# Patient Record
Sex: MALE | Race: WHITE
[De-identification: names, ages, dates, MRNs, and addresses within clinical notes are randomized per-mention and may not be internally consistent; named-entity substitution may affect disease eponyms.]

---

## 2020-01-31 ENCOUNTER — HOSPITAL ENCOUNTER (EMERGENCY)
Dept: HOSPITAL 74 - FER | Age: 40
Discharge: HOME | End: 2020-01-31
Payer: COMMERCIAL

## 2020-01-31 VITALS — HEART RATE: 65 BPM | SYSTOLIC BLOOD PRESSURE: 116 MMHG | TEMPERATURE: 98.4 F | DIASTOLIC BLOOD PRESSURE: 71 MMHG

## 2020-01-31 VITALS — BODY MASS INDEX: 31.7 KG/M2

## 2020-01-31 DIAGNOSIS — R09.81: Primary | ICD-10-CM

## 2020-01-31 NOTE — PDOC
Attending Attestation





- Resident


Resident Name: KalieCariCandace





- ED Attending Attestation


I have performed the following: I have examined & evaluated the patient, The 

case was reviewed & discussed with the resident, I agree w/resident's findings 

& plan, Exceptions are as noted





- HPI


HPI: 





01/31/20 18:39


39-year-old male no past medical history here today complaining of 2 weeks of 

nasal congestion and sore throat and now productive cough.  Patient states that 

he initially thought it was sinusitis as he has had allergic sinusitis in the 

past.  Started taking Claritin without much relief.  Over the last few days has 

developed now a sore throat and green phlegm with worsening sinus congestion.  

Denies any fevers or chills no nausea no vomiting tolerating p.o. denies any 

recent travel does have a sick contact of his wife who is also being seen here 

today for sore throat and nasal congestion.  Has been taking DayQuil with 

minimal relief no other current complaints





- Physicial Exam


PE: 





01/31/20 18:40


Awake alert no acute distress bilateral nasal turbinate enlargement clear 

rhinorrhea throat with posterior pharynx cobblestoning.  No tonsillar exudates.

  Lungs are clear bilaterally heart is regular 30 murmurs rubs or gallops 

abdomen soft nontender skin is warm and dry patient is awake alert and oriented 

x3





- Medical Decision Making





01/31/20 18:40


39-year-old male here with sinusitis-like symptoms and now postnasal drip with 

cough possibly viral versus allergic in origin.  Likely a viral viral 

component.  Plan Sudafed recommended in addition to Flonase lots hydration and 

rest.  Prescription to pharmacy sent for Sudafed and Flonase to be used as 

directed.

## 2020-01-31 NOTE — PDOC
History of Present Illness





- General


Chief Complaint: Cold Symptoms


Stated Complaint: FLU SYMPTOMS


Time Seen by Provider: 01/31/20 17:35





- History of Present Illness


Initial Comments: 


HPI: 


HPI: 


38yo M with no reported PMH complaining of cough, congestion, chills for the 

past two weeks. Patient's sick contacts include his wife who has similar 

symptoms, but they started after his. No fevers. Cough was productive of clear 

sputum that has since turned yellow. Reports chills. Has taken dayquil and 

claritin for his symptoms with some relief. No nausea or vomiting. Tolerating po

, but has a lessened appetite. Denies recent travel. No chest pain or shortness 

of breath. 





ROS:


Constitutional: no fever, +chills


HEENT: no throat pain, no dysphagia


Cardiovascular: no chest pain, no palpitations


Respiratory: +cough, no shortness of breath


Gastrointestinal: no abdominal pain, no nausea


Genitourinary: no dysuria, no hematuria


Musculoskeletal: no myalgia, no arthralgia


Skin: no rash, no itching


Neurologic: no headache, no weakness


Psych: no agitation, no confusion





PE: 


General: Awake, alert, and fully oriented, in no acute distress


Head: No signs of trauma


Eyes: EOMI, sclera anicteric


ENT: Moist mucus membranes


Neck: Normal ROM, supple


Lungs: Lungs clear, Normal breath sounds


Cardio: Regular rhythm, S1 and S2 present


Abdomen: Soft, nontender


Extremities: Normal range of motion


SKIN: Warm, Dry, normal turgor


Neurologic: Cranial nerves II through XII grossly intact. Normal speech





ED Course/MDM: 


DDX including but not limited to viral illness, bronchitis, anemia, metabolic 

derangement


Presentation consistent with viral syndrome


VS WNL


Counseled supportive care


Good handwashing


Sudafed, flonase, motrin sent to pharmacy


Return precautions


Stable for discharge








Past History





- Past Medical History


Allergies/Adverse Reactions: 


 Allergies











Allergy/AdvReac Type Severity Reaction Status Date / Time


 


No Known Allergies Allergy   Verified 01/31/20 17:39











Home Medications: 


Ambulatory Orders





No Home Medications 0 dose .ROUTE UTDICT 02/13/13 


Fluticasone Prop 0.05% Nasal [Flonase -] 1 - 2 spray NS DAILY #1 spray.pump 01/ 31/20 


Ibuprofen 400 mg PO Q6H PRN #30 tablet 01/31/20 


Pseudoephedrine HCl [Sudafed] 60 mg PO Q6H #20 tablet 01/31/20 








Asthma: Yes


COPD: No





- Psycho Social/Smoking Cessation Hx


Smoking Status: No


Smoking History: Never smoked


Number of Cigarettes Smoked Daily: 0


Hx Alcohol Use: No


Drug/Substance Use Hx: No





*Physical Exam





- Vital Signs


 Last Vital Signs











Temp Pulse Resp BP Pulse Ox


 


 98.4 F   65   19   116/71   97 


 


 01/31/20 17:32  01/31/20 17:32  01/31/20 17:32  01/31/20 17:32  01/31/20 17:32














Discharge





- Discharge Information


Problems reviewed: Yes


Clinical Impression/Diagnosis: 


 Congestion of nasal sinus





Condition: Stable


Disposition: HOME





- Additional Discharge Information


Prescriptions: 


Fluticasone Prop 0.05% Nasal [Flonase -] 1 - 2 spray NS DAILY #1 spray.pump


Ibuprofen 400 mg PO Q6H PRN #30 tablet


 PRN Reason: Pain


Pseudoephedrine HCl [Sudafed] 60 mg PO Q6H #20 tablet





- Follow up/Referral





- Patient Discharge Instructions


Patient Printed Discharge Instructions:  DI for Nasal Congestion


Additional Instructions: 


You came into the emergency department. Your exam did not indicate acute 

pathology. 





Eat and hydrate throughout the day to prevent dehydration and low blood sugar 

levels. 





Prescription for ibuprofen (also available over the counter), flonase, and 

sudafed sent to your pharmacy. Take as instructed. 





Follow up with your primary care physician within 72 hours. Call and make an 

appointment to further evaluate your symptoms.  Your workup is not complete 

until you do so. 





Immediate medical attention is required if you have: any chest pain, 

palpitations, shortness of breath, severe headaches, changes in vision, 

episodes of fainting, focal numbness or weakness, any severe abdominal pain, 

any black tarry stool, or any new or concerning symptoms.  If you think you are 

having an emergency, call for emergency medical services or present to the 

emergency department right away.








- Post Discharge Activity